# Patient Record
Sex: MALE | Race: WHITE | NOT HISPANIC OR LATINO | URBAN - METROPOLITAN AREA
[De-identification: names, ages, dates, MRNs, and addresses within clinical notes are randomized per-mention and may not be internally consistent; named-entity substitution may affect disease eponyms.]

---

## 2024-01-31 ENCOUNTER — OFFICE VISIT (OUTPATIENT)
Dept: DERMATOLOGY | Facility: CLINIC | Age: 34
End: 2024-01-31
Payer: COMMERCIAL

## 2024-01-31 DIAGNOSIS — L21.9 SEBORRHEIC DERMATITIS: ICD-10-CM

## 2024-01-31 DIAGNOSIS — L85.3 XEROSIS CUTIS: ICD-10-CM

## 2024-01-31 DIAGNOSIS — L73.9 FOLLICULITIS: Primary | ICD-10-CM

## 2024-01-31 PROCEDURE — 99204 OFFICE O/P NEW MOD 45 MIN: CPT | Performed by: DERMATOLOGY

## 2024-01-31 RX ORDER — CLINDAMYCIN PHOSPHATE 10 UG/ML
LOTION TOPICAL 2 TIMES DAILY
Qty: 60 ML | Refills: 11 | Status: SHIPPED | OUTPATIENT
Start: 2024-01-31 | End: 2025-01-30

## 2024-01-31 RX ORDER — KETOCONAZOLE 20 MG/ML
SHAMPOO, SUSPENSION TOPICAL 3 TIMES WEEKLY
Qty: 120 ML | Refills: 11 | Status: SHIPPED | OUTPATIENT
Start: 2024-01-31

## 2024-01-31 RX ORDER — BENZOYL PEROXIDE 50 MG/ML
1 LIQUID TOPICAL DAILY
Qty: 227 G | Refills: 11 | Status: SHIPPED | OUTPATIENT
Start: 2024-01-31

## 2024-01-31 ASSESSMENT — DERMATOLOGY PATIENT ASSESSMENT
DO YOU USE SUNSCREEN: OCCASIONALLY
ARE YOU AN ORGAN TRANSPLANT RECIPIENT: NO
DO YOU USE A TANNING BED: NO
DO YOU HAVE ANY NEW OR CHANGING LESIONS: NO

## 2024-01-31 ASSESSMENT — ITCH NUMERIC RATING SCALE: HOW SEVERE IS YOUR ITCHING?: 0

## 2024-01-31 ASSESSMENT — DERMATOLOGY QUALITY OF LIFE (QOL) ASSESSMENT: ARE THERE EXCLUSIONS OR EXCEPTIONS FOR THE QUALITY OF LIFE ASSESSMENT: NO

## 2024-01-31 NOTE — PROGRESS NOTES
"Subjective     Osbaldo Telles is a 33 y.o. male who presents for the following: Rash (In scalp).  He states he began breaking out with \"pimples\" in his scalp 2 years ago.  He was initially treated with a 2-week course of doxycycline, which helped, but the pimples then came back.  He states the problem is not as bad now as it used to be, but he still continues to breakout.    Review of Systems:  No other skin or systemic complaints other than what is documented elsewhere in the note.    The following portions of the chart were reviewed this encounter and updated as appropriate:       Skin Cancer History  No skin cancer on file.    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    Current Medications / CAM's:    Current Outpatient Medications:     benzoyl peroxide 5 % external wash, Apply 1 Application topically once daily. In the morning, Disp: 227 g, Rfl: 11    clindamycin (Cleocin T) 1 % lotion, Apply topically 2 times a day., Disp: 60 mL, Rfl: 11    ketoconazole (NIZOral) 2 % shampoo, Apply topically 3 times a week., Disp: 120 mL, Rfl: 11     Objective   Well appearing patient in no apparent distress; mood and affect are within normal limits.    A skin examination was performed including: Face, neck, and scalp. All findings within normal limits unless otherwise noted below.    Assessment/Plan   1. Folliculitis  Scalp  Scattered on the patient's scalp, there are several follicular-based erythematous, inflammatory papules and pustules    Folliculitis -scalp.  The bacterial nature of this condition and treatment options were discussed extensively with the patient today.  At this time, I recommend combination topical antibiotic therapy with " Benzoyl Peroxide 5% creamy wash, which the patient was instructed to use to wash the affected areas once or twice daily, and Clindamycin 1% lotion, which the patient was instructed to apply twice daily to the affected areas or up to 3-4 times per day as needed for active lesions.  In addition, I recommend topical anti-fungal therapy with Ketoconazole 2% shampoo as detailed below.  The risks, benefits, and side effects of these medications, including dryness and irritation with BP use, were discussed.  The patient expressed understanding and is in agreement with this plan.    ketoconazole (NIZOral) 2 % shampoo - Scalp  Apply topically 3 times a week.    clindamycin (Cleocin T) 1 % lotion - Scalp  Apply topically 2 times a day.    benzoyl peroxide 5 % external wash - Scalp  Apply 1 Application topically once daily. In the morning    2. Seborrheic dermatitis  Scalp  On the patient's scalp, there are pink, scaly patches with whitish-yellowish, greasy scale    Seborrheic Dermatitis - scalp.  The potentially chronic and intermittently flaring nature of this condition and treatment options were discussed extensively with the patient today.  At this time, I recommend topical anti-fungal therapy with Ketoconazole 2% shampoo, which the patient was instructed to use 2-3 days per week, alternating with over-the-counter anti-dandruff shampoos, such as Head & Shoulders, Selsun Blue, and Neutrogena T-gel, every month.  The risks, benefits, and side effects of this medication were discussed.  The patient expressed understanding and is in agreement with this plan.    ketoconazole (NIZOral) 2 % shampoo - Scalp  Apply topically 3 times a week.    3. Xerosis cutis  Diffuse generalized dry, scaly skin.    Xerosis.  I emphasized the importance of dry, sensitive skin care, including the use of a mild soap, such as Dove, and frequent and aggressive moisturization, at least twice daily and immediately following showers or baths, with  recommended over-the-counter moisturizing creams, such as Eucerin, Cetaphil, Cerave, or Aveeno, or Vaseline or Aquaphor ointments.

## 2024-02-27 ENCOUNTER — APPOINTMENT (OUTPATIENT)
Dept: DERMATOLOGY | Facility: CLINIC | Age: 34
End: 2024-02-27
Payer: COMMERCIAL